# Patient Record
(demographics unavailable — no encounter records)

---

## 2025-07-17 NOTE — ADDENDUM
[FreeTextEntry1] : This note was written by Nidia Palacio on 07/15/2025 acting solely as a scribe for Dr. Dane Carbajal.   All medical record entries made by the Scribe were at my, Dr. Dane Carbajal, direction and personally dictated by me on 07/15/2025. I have personally reviewed the chart and agree that the record accurately reflects my personal performance of the history, physical exam, assessment and plan.

## 2025-07-17 NOTE — DISCUSSION/SUMMARY
[de-identified] : 29 y/o male with left ankle sprain.  The patient presents with an acute inversion injury to the left ankle with an injury to the distal fibula as well as a lateral ligamentous complex. I outlined a treatment protocol that will require a period of activity modification and relative rest. Further nonoperative modalities of treatment include relative rest from impact loading exercise, NSAID's/Tylenol prn, cold compress for swelling control, compressive wraps/bracing, gradual return to weight bearing and load bearing exercise with or without the guidance of physical therapy for balance/proprioceptive/strength training.  In addition, I discussed the spectrum of sprain injuries and short- and long-term outcomes. The majority of sprain respond well to nonoperative modalities of treatment, and the indication for surgical management is typically reserved for ankle joints that become chronically and grossly unstable.   Recommendation: 2 weeks of CAM immobilization. Ice/Tylenol/NSAIDs p.r.n.. HEP provided. MRI Rx given to evaluate the degree of ligamentous injury.  Follow up after MRI

## 2025-07-17 NOTE — PHYSICAL EXAM
[de-identified] : Left Ankle exam:  Skin: Clean, dry, intact Inspection: No obvious malalignment, + swelling, no effusion, +ecchymosis Pulses: 2+ DP/PT pulses ROM: normal degrees of dorsiflexion, normal degrees of plantarflexion, normal subtalar motion. Tenderness: + tenderness over the lateral malleolus, + ATFL pain. No medial malleolus pain, no deltoid ligament pain. No proximal fibular pain. No heel pain. Stability: Negative anterior drawer, negative posterior drawer. Strength: 5/5 TA/GS/EHL 5/5 inversion/eversion Neuro: In tact to light touch throughout Additional test: Negative syndesmosis squeeze test. Other findings: None.  [de-identified] : 3 views of the left ankle were obtained, 07/09/2025, that show no acute fracture or dislocation. There is no significant degenerative change seen. There is no gross malalignment. Ankle mortise is intact. Calcifications lateral ankle.